# Patient Record
Sex: FEMALE | Race: WHITE | Employment: OTHER | URBAN - METROPOLITAN AREA
[De-identification: names, ages, dates, MRNs, and addresses within clinical notes are randomized per-mention and may not be internally consistent; named-entity substitution may affect disease eponyms.]

---

## 2024-05-16 ENCOUNTER — OFFICE VISIT (OUTPATIENT)
Age: 79
End: 2024-05-16
Payer: COMMERCIAL

## 2024-05-16 VITALS
OXYGEN SATURATION: 98 % | DIASTOLIC BLOOD PRESSURE: 86 MMHG | WEIGHT: 129 LBS | HEIGHT: 67 IN | SYSTOLIC BLOOD PRESSURE: 124 MMHG | BODY MASS INDEX: 20.25 KG/M2 | HEART RATE: 78 BPM

## 2024-05-16 DIAGNOSIS — G20.A1 PARKINSON'S DISEASE WITHOUT DYSKINESIA, UNSPECIFIED WHETHER MANIFESTATIONS FLUCTUATE (HCC): Primary | ICD-10-CM

## 2024-05-16 PROCEDURE — 99204 OFFICE O/P NEW MOD 45 MIN: CPT | Performed by: PSYCHIATRY & NEUROLOGY

## 2024-05-16 PROCEDURE — 1090F PRES/ABSN URINE INCON ASSESS: CPT | Performed by: PSYCHIATRY & NEUROLOGY

## 2024-05-16 PROCEDURE — G8427 DOCREV CUR MEDS BY ELIG CLIN: HCPCS | Performed by: PSYCHIATRY & NEUROLOGY

## 2024-05-16 PROCEDURE — 1036F TOBACCO NON-USER: CPT | Performed by: PSYCHIATRY & NEUROLOGY

## 2024-05-16 PROCEDURE — G8400 PT W/DXA NO RESULTS DOC: HCPCS | Performed by: PSYCHIATRY & NEUROLOGY

## 2024-05-16 PROCEDURE — G8420 CALC BMI NORM PARAMETERS: HCPCS | Performed by: PSYCHIATRY & NEUROLOGY

## 2024-05-16 PROCEDURE — 1123F ACP DISCUSS/DSCN MKR DOCD: CPT | Performed by: PSYCHIATRY & NEUROLOGY

## 2024-05-16 RX ORDER — VITAMIN E 268 MG
400 CAPSULE ORAL DAILY
COMMUNITY

## 2024-05-16 RX ORDER — MULTIVIT-MIN/FERROUS GLUCONATE 9 MG/15 ML
15 LIQUID (ML) ORAL DAILY
COMMUNITY

## 2024-05-16 RX ORDER — CYANOCOBALAMIN (VITAMIN B-12) 500 MCG
TABLET ORAL
COMMUNITY

## 2024-05-16 RX ORDER — CARBIDOPA AND LEVODOPA 25; 100 MG/1; MG/1
1 TABLET, ORALLY DISINTEGRATING ORAL DAILY
COMMUNITY
End: 2024-05-16

## 2024-05-16 RX ORDER — AMLODIPINE AND BENAZEPRIL HYDROCHLORIDE 5; 40 MG/1; MG/1
CAPSULE ORAL
COMMUNITY
Start: 2024-04-05

## 2024-05-16 RX ORDER — CALCIUM CARBONATE 500(1250)
500 TABLET ORAL DAILY
COMMUNITY

## 2024-05-16 RX ORDER — ASPIRIN 81 MG/1
81 TABLET, CHEWABLE ORAL DAILY
COMMUNITY

## 2024-05-16 RX ORDER — ONDANSETRON HYDROCHLORIDE 8 MG/1
TABLET, FILM COATED ORAL
COMMUNITY
Start: 2024-04-01

## 2024-05-16 NOTE — PROGRESS NOTES
Chief Complaint   Patient presents with    Neurologic Problem     Having shakiness and tremors as well as overall weakness     Continue  HISTORY OF PRESENT ILLNESS  Afshan Reagan is a 78 y.o. female who came in to establish care regarding Parkinson's disease.  She just saw a neurologist up in [facility] about 3 years ago and Parkinson's was suspected.  She was tried on levodopa carbidopa but she had difficulty tolerating it as it caused severe GI upset.  Since then, she has not been on any medications.  She has difficulties with mobility, balance and has sustained an occasional fall.  Her voice has slowed down, she has difficulty swallowing, has reduced eye blink, facial expression is down.  She walks like robot with little to no arm swing and arms held close to the body with flexion of the elbows.  Her handwriting has also become smaller and jerky.       History reviewed. No pertinent past medical history.  Current Outpatient Medications   Medication Sig    amLODIPine-benazepril (LOTREL) 5-40 MG per capsule     ondansetron (ZOFRAN) 8 MG tablet     sertraline (ZOLOFT) 50 MG tablet     LANREOTIDE ACETATE SC Inject into the skin    vitamin E 400 UNIT capsule Take 1 capsule by mouth daily    calcium carbonate (OSCAL) 500 MG TABS tablet Take 1 tablet by mouth daily    Omega-3 Fatty Acids (FISH OIL) 300 MG CAPS Take by mouth    Multiple Vitamins-Minerals (CENTRUM/CERTA-KEITH WITH MINERALS ORAL) solution Take 15 mLs by mouth daily    aspirin 81 MG chewable tablet Take 1 tablet by mouth daily    carbidopa-levodopa (SINEMET)  MG per tablet Take 1 tablet by mouth 3 times daily     No current facility-administered medications for this visit.     No Known Allergies  No family history on file.  Social History     Tobacco Use    Smoking status: Never     Passive exposure: Past    Smokeless tobacco: Never   Vaping Use    Vaping Use: Never used   Substance Use Topics    Drug use: Never     No past surgical history on

## 2024-05-16 NOTE — PROGRESS NOTES
Chief Complaint   Patient presents with    Neurologic Problem     Having shakiness and tremors as well as overall weakness     Vitals:    05/16/24 0953   BP: 124/86   Pulse: 78   SpO2: 98%

## 2025-03-07 ENCOUNTER — OFFICE VISIT (OUTPATIENT)
Age: 80
End: 2025-03-07
Payer: MEDICARE

## 2025-03-07 VITALS
RESPIRATION RATE: 17 BRPM | BODY MASS INDEX: 20.25 KG/M2 | SYSTOLIC BLOOD PRESSURE: 128 MMHG | OXYGEN SATURATION: 98 % | HEIGHT: 67 IN | DIASTOLIC BLOOD PRESSURE: 74 MMHG | HEART RATE: 68 BPM | WEIGHT: 129 LBS

## 2025-03-07 DIAGNOSIS — G20.A1 PARKINSON'S DISEASE WITHOUT DYSKINESIA, UNSPECIFIED WHETHER MANIFESTATIONS FLUCTUATE (HCC): Primary | ICD-10-CM

## 2025-03-07 PROCEDURE — G8400 PT W/DXA NO RESULTS DOC: HCPCS

## 2025-03-07 PROCEDURE — G8420 CALC BMI NORM PARAMETERS: HCPCS

## 2025-03-07 PROCEDURE — 1090F PRES/ABSN URINE INCON ASSESS: CPT

## 2025-03-07 PROCEDURE — 1123F ACP DISCUSS/DSCN MKR DOCD: CPT

## 2025-03-07 PROCEDURE — 1125F AMNT PAIN NOTED PAIN PRSNT: CPT

## 2025-03-07 PROCEDURE — G8427 DOCREV CUR MEDS BY ELIG CLIN: HCPCS

## 2025-03-07 PROCEDURE — 1160F RVW MEDS BY RX/DR IN RCRD: CPT

## 2025-03-07 PROCEDURE — 1159F MED LIST DOCD IN RCRD: CPT

## 2025-03-07 PROCEDURE — 99215 OFFICE O/P EST HI 40 MIN: CPT

## 2025-03-07 PROCEDURE — 1036F TOBACCO NON-USER: CPT

## 2025-03-07 RX ORDER — ESCITALOPRAM OXALATE 10 MG/1
10 TABLET ORAL
COMMUNITY
Start: 2024-12-26 | End: 2025-03-26

## 2025-03-07 RX ORDER — TAMSULOSIN HYDROCHLORIDE 0.4 MG/1
CAPSULE ORAL
COMMUNITY
Start: 2025-02-14

## 2025-03-07 RX ORDER — CARBIDOPA AND LEVODOPA 25; 100 MG/1; MG/1
TABLET ORAL
Qty: 270 TABLET | Refills: 1 | Status: SHIPPED | OUTPATIENT
Start: 2025-03-07

## 2025-03-07 RX ORDER — PANTOPRAZOLE SODIUM 40 MG/1
40 TABLET, DELAYED RELEASE ORAL
COMMUNITY
Start: 2025-03-04

## 2025-03-07 RX ORDER — OLMESARTAN MEDOXOMIL 20 MG/1
TABLET ORAL
COMMUNITY
Start: 2025-02-14

## 2025-03-07 ASSESSMENT — PATIENT HEALTH QUESTIONNAIRE - PHQ9
1. LITTLE INTEREST OR PLEASURE IN DOING THINGS: NOT AT ALL
SUM OF ALL RESPONSES TO PHQ QUESTIONS 1-9: 0
2. FEELING DOWN, DEPRESSED OR HOPELESS: NOT AT ALL
SUM OF ALL RESPONSES TO PHQ QUESTIONS 1-9: 0

## 2025-03-07 ASSESSMENT — ENCOUNTER SYMPTOMS: PHOTOPHOBIA: 0

## 2025-03-07 NOTE — PROGRESS NOTES
Chief Complaint   Patient presents with    Follow-up     Parkinsons.  Patient reports balance issues, slower though process      Vitals:    03/07/25 1051   BP: 128/74   Pulse: 68   Resp: 17   SpO2: 98%      
has a normal appearance.  Small uterine fibroids  are present. There is no significant abnormality in the adnexae.  The  bowel loops are unremarkable.  There is no ascites or adenopathy.  No  inguinal hernia is identified.    Impression  No evidence of acute process in the abdomen and pelvis. No  adnexal mass is identified.        Note that CT scanning at this site utilizes multiple dose reduction  techniques, including automatic exposure control, adjustment of the MAA  and/or KVP according to the patient's size, and use of  iterative  reconstruction technique.    Sheng Beckham MD  1/4/2017 5:29 PM        MRI Results (maximum last 3):  MRI Result (most recent):  MRI SHOULDER RIGHT W WO CONTRAST 03/19/2022    Narrative  EXAM: MRI OF THE RIGHT SHOULDER WITH AND WITHOUT CONTRAST    CLINICAL HISTORY: 76-year-old woman, pain, evaluate for joint  image and inflammatory arthritis    TECHNIQUE: MRI of the right shoulder was performed on a 1.5 Noemi  magnet before and after the uneventful administration of 13 mL Clariscan  intravenous contrast. Coronal PD, coronal T2 fat sat, axial PD fat sat,  sagittal T1, and sagittal T2 fat sat sequences were performed. Imaging  is degraded by patient motion.    COMPARISON: None available at this time    FINDINGS:    ROTATOR CUFF:  TENDONS: There is a suspected partial-thickness, delaminating,  interstitial tear of the distal subscapularis tendon measuring 15 mm in  length and involving less than 20% of the fibers (sagittal series 16,  image 16). The supraspinatus, infraspinatus, and teres minor tendons are  intact.  MUSCULATURE: Normal in bulk without edema, atrophy, or mass lesion.    LONG HEAD BICEPS TENDON: Intact and well-positioned in the bicipital  groove.    GLENOHUMERAL JOINT, ARTICULAR CARTILAGE, LABRUM: There is a a large,  markedly complex glenohumeral joint effusion. There is a 13 x 12 x 13 mm  hypointense loose body in the posterior axillary pouch. There is

## 2025-05-27 ENCOUNTER — OFFICE VISIT (OUTPATIENT)
Age: 80
End: 2025-05-27
Payer: MEDICARE

## 2025-05-27 VITALS
SYSTOLIC BLOOD PRESSURE: 124 MMHG | HEIGHT: 67 IN | BODY MASS INDEX: 21.82 KG/M2 | HEART RATE: 66 BPM | DIASTOLIC BLOOD PRESSURE: 78 MMHG | WEIGHT: 139 LBS | OXYGEN SATURATION: 98 %

## 2025-05-27 DIAGNOSIS — G20.A1 PARKINSON'S DISEASE WITHOUT DYSKINESIA, UNSPECIFIED WHETHER MANIFESTATIONS FLUCTUATE (HCC): Primary | ICD-10-CM

## 2025-05-27 DIAGNOSIS — F32.A DEPRESSION, UNSPECIFIED DEPRESSION TYPE: ICD-10-CM

## 2025-05-27 PROCEDURE — G8427 DOCREV CUR MEDS BY ELIG CLIN: HCPCS | Performed by: PSYCHIATRY & NEUROLOGY

## 2025-05-27 PROCEDURE — G8420 CALC BMI NORM PARAMETERS: HCPCS | Performed by: PSYCHIATRY & NEUROLOGY

## 2025-05-27 PROCEDURE — G8400 PT W/DXA NO RESULTS DOC: HCPCS | Performed by: PSYCHIATRY & NEUROLOGY

## 2025-05-27 PROCEDURE — 99214 OFFICE O/P EST MOD 30 MIN: CPT | Performed by: PSYCHIATRY & NEUROLOGY

## 2025-05-27 PROCEDURE — 1123F ACP DISCUSS/DSCN MKR DOCD: CPT | Performed by: PSYCHIATRY & NEUROLOGY

## 2025-05-27 PROCEDURE — 1090F PRES/ABSN URINE INCON ASSESS: CPT | Performed by: PSYCHIATRY & NEUROLOGY

## 2025-05-27 PROCEDURE — 1159F MED LIST DOCD IN RCRD: CPT | Performed by: PSYCHIATRY & NEUROLOGY

## 2025-05-27 PROCEDURE — 1036F TOBACCO NON-USER: CPT | Performed by: PSYCHIATRY & NEUROLOGY

## 2025-05-27 RX ORDER — GLUCOSAMINE SULFATE DIPOT CHLR 1000 MG
TABLET ORAL NIGHTLY
COMMUNITY

## 2025-05-27 RX ORDER — CARBIDOPA AND LEVODOPA 25; 100 MG/1; MG/1
1.5 TABLET ORAL 3 TIMES DAILY
Qty: 405 TABLET | Refills: 1 | Status: SHIPPED | OUTPATIENT
Start: 2025-05-27

## 2025-05-27 RX ORDER — SENNOSIDES 8.6 MG/1
TABLET ORAL
COMMUNITY
Start: 2025-03-04

## 2025-05-27 ASSESSMENT — PATIENT HEALTH QUESTIONNAIRE - PHQ9
1. LITTLE INTEREST OR PLEASURE IN DOING THINGS: SEVERAL DAYS
2. FEELING DOWN, DEPRESSED OR HOPELESS: SEVERAL DAYS
SUM OF ALL RESPONSES TO PHQ QUESTIONS 1-9: 2

## 2025-05-27 NOTE — PROGRESS NOTES
Chief Complaint   Patient presents with    Neurologic Problem     Parkinson's, \"more wobbly, and more withdrawn\"     .  Vitals:    05/27/25 1151   BP: 124/78   Pulse: 66   SpO2: 98%

## 2025-05-27 NOTE — PROGRESS NOTES
Chief Complaint   Patient presents with    Neurologic Problem     Parkinson's, \"more wobbly, and more withdrawn\"     Continue  HISTORY OF PRESENT ILLNESS  Afshan Reagan came back for follow-up.  She was last seen by me a year ago and recently saw Ami Hugo NP.  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  She has now titrated up the dose of carbidopa levodopa 25/100 mg, 1 tablet 3 times daily and it seems to be helping.  She does not get tremors anymore but she still has balance/postural instability, has had an occasional fall in ambulates using a walker.  Denies any fluctuations in her motor function during the day.  She has been doing some therapy at her facility.     RECAP  She just saw a neurologist up in [facility] about 3 years ago and Parkinson's was suspected.  She was tried on levodopa carbidopa but she had difficulty tolerating it as it caused severe GI upset.  Since then, she has not been on any medications.  She has difficulties with mobility, balance and has sustained an occasional fall.  Her voice has slowed down, she has difficulty swallowing, has reduced eye blink, facial expression is down.  She walks like robot with little to no arm swing and arms held close to the body with flexion of the elbows.  Her handwriting has also become smaller and jerky.     PHYSICAL EXAMINATION:    Vitals:    05/27/25 1151   BP: 124/78   Pulse: 66   SpO2: 98%     NEUROLOGICAL EXAMINATION:     Mental Status:   Alert and oriented to person, place, and time.  Attention span and concentration are normal. Speech is fluent.      Cranial Nerves:    II, III, IV, VI:  Visual acuity grossly intact. Visual fields are normal.    Pupils are equal, round, and reactive to light.    Extra-ocular movements are full and fluid.    V-XII: Hearing is grossly intact.  Facial features are symmetric, with normal sensation and strength.  The palate rises symmetrically and the tongue protrudes midline.

## 2025-05-27 NOTE — PATIENT INSTRUCTIONS
[facility]iat[facility]  Address: [address] [place] [address]  Phone: [phone]    [facility]   Address: [address], [place] [address]  Phone: [phone]    [facility] (In person/virtual)   [facility]  Address: [address], [place] [address]  Phone: [phone]    [place] Location  Address: [address] [place] [address]  Phone: [phone]    [place] [facility]  Address: [address], [place] [address]  Phone: [phone]    [place] Location  Address: [address], [place] [address]  Phone: [phone]    [place] Location  Address: [address], [place] [address]  Phone: [phone]    [facility]  Address: [address], [place] [address]  Phone: [phone]    [facility]  [facility]  [address] [place]  Phone: [phone]    [facility]  [address]  [place], [place]  Phone: [phone]    [facility]  Address: [address] [place] [address]  Phone: [phone]    [facility]  Address: [address], [place] [address]  Phone: [phone]    [facility]  Address: [address], [place] [address]  Phone: [phone]    [facility], [facility]  Address: [address] [address]  Phone: [phone]    [facility], [facility]  Address: [address] [place] [address]  Phone: [phone]